# Patient Record
Sex: MALE
[De-identification: names, ages, dates, MRNs, and addresses within clinical notes are randomized per-mention and may not be internally consistent; named-entity substitution may affect disease eponyms.]

---

## 2018-10-12 ENCOUNTER — HOSPITAL ENCOUNTER (OUTPATIENT)
Dept: HOSPITAL 14 - H.OPSURG | Age: 48
Discharge: HOME | End: 2018-10-12
Attending: ORTHOPAEDIC SURGERY
Payer: COMMERCIAL

## 2018-10-12 VITALS
OXYGEN SATURATION: 98 % | TEMPERATURE: 98 F | SYSTOLIC BLOOD PRESSURE: 124 MMHG | HEART RATE: 93 BPM | DIASTOLIC BLOOD PRESSURE: 73 MMHG | RESPIRATION RATE: 17 BRPM

## 2018-10-12 VITALS — BODY MASS INDEX: 27.2 KG/M2

## 2018-10-12 DIAGNOSIS — M48.02: ICD-10-CM

## 2018-10-12 DIAGNOSIS — G47.33: ICD-10-CM

## 2018-10-12 DIAGNOSIS — M47.22: ICD-10-CM

## 2018-10-12 DIAGNOSIS — E78.5: ICD-10-CM

## 2018-10-12 DIAGNOSIS — M50.121: Primary | ICD-10-CM

## 2018-10-12 LAB
BASOPHILS # BLD AUTO: 0.1 K/UL (ref 0–0.2)
BASOPHILS NFR BLD: 1 % (ref 0–2)
EOSINOPHIL # BLD AUTO: 0.2 K/UL (ref 0–0.7)
EOSINOPHIL NFR BLD: 3 % (ref 0–4)
ERYTHROCYTE [DISTWIDTH] IN BLOOD BY AUTOMATED COUNT: 13.4 % (ref 11.5–14.5)
HGB BLD-MCNC: 15.3 G/DL (ref 12–18)
LYMPHOCYTES # BLD AUTO: 1.7 K/UL (ref 1–4.3)
LYMPHOCYTES NFR BLD AUTO: 29.2 % (ref 20–40)
MCH RBC QN AUTO: 30.4 PG (ref 27–31)
MCHC RBC AUTO-ENTMCNC: 35.1 G/DL (ref 33–37)
MCV RBC AUTO: 86.5 FL (ref 80–94)
MONOCYTES # BLD: 0.7 K/UL (ref 0–0.8)
MONOCYTES NFR BLD: 12.2 % (ref 0–10)
NEUTROPHILS # BLD: 3.2 K/UL (ref 1.8–7)
NEUTROPHILS NFR BLD AUTO: 54.6 % (ref 50–75)
NRBC BLD AUTO-RTO: 0.1 % (ref 0–0)
PLATELET # BLD: 217 K/UL (ref 130–400)
PMV BLD AUTO: 8.2 FL (ref 7.2–11.7)
RBC # BLD AUTO: 5.04 MIL/UL (ref 4.4–5.9)
WBC # BLD AUTO: 5.8 K/UL (ref 4.8–10.8)

## 2018-10-12 PROCEDURE — 22552 ARTHRD ANT NTRBD CERVICAL EA: CPT

## 2018-10-12 PROCEDURE — 86850 RBC ANTIBODY SCREEN: CPT

## 2018-10-12 PROCEDURE — 88304 TISSUE EXAM BY PATHOLOGIST: CPT

## 2018-10-12 PROCEDURE — 36415 COLL VENOUS BLD VENIPUNCTURE: CPT

## 2018-10-12 PROCEDURE — 85025 COMPLETE CBC W/AUTO DIFF WBC: CPT

## 2018-10-12 PROCEDURE — 95941 IONM REMOTE/>1 PT OR PER HR: CPT

## 2018-10-12 PROCEDURE — 22551 ARTHRD ANT NTRBDY CERVICAL: CPT

## 2018-10-12 PROCEDURE — 86900 BLOOD TYPING SEROLOGIC ABO: CPT

## 2018-10-15 NOTE — RAD
Date of service: 



10/12/2018



PROCEDURE:  Fluoroscopy up to 1 hr.



HISTORY:

ACDF C4-5 C5-6



COMPARISON:

None



TECHNIQUE:

Standard protocol for this study/examination.



FINDINGS:

 Total fluoroscopic time (continuous mode) utilized during the 

procedure 9.4 (seconds). 



Total exam DLP: 1.10 (mGy).



IMPRESSION:

Less than 1 hr fluoroscopic assistance provided during performance of 

the procedure.